# Patient Record
Sex: FEMALE | Race: WHITE
[De-identification: names, ages, dates, MRNs, and addresses within clinical notes are randomized per-mention and may not be internally consistent; named-entity substitution may affect disease eponyms.]

---

## 2017-10-26 ENCOUNTER — HOSPITAL ENCOUNTER (OUTPATIENT)
Dept: HOSPITAL 53 - M WHC | Age: 62
End: 2017-10-26
Attending: PHYSICIAN ASSISTANT
Payer: COMMERCIAL

## 2017-10-26 DIAGNOSIS — Z12.31: Primary | ICD-10-CM

## 2017-10-26 NOTE — REPMRS
Patient History

The patient states she had a clinical breast exam in 10/16

Patient is postmenopausal.

 

Digital Woman Screen Mammo: October 26, 2017 - Exam #: 

[id number]

Bilateral CC and MLO view(s) were taken.

 

Technologist: Sharifa Mcdonald, Technologist

Prior study comparison: October 28, 2016, digital woman screen 

mammo performed at [facility].  October 13, 2015, 

digital woman screen mammo performed at [facility].

 

FINDINGS: There are scattered fibroglandular densities.  There 

has been no change in the appearance of the mammogram from the 

prior studies.  There is a mild amount of residual fibroglandular

tissue which is fairly symmetric. There is no interval 

development of dominant mass, architectural distortion, or 

clustered microcalcification suggestive of malignancy.

 

ASSESSMENT: BI-RADS/ACR category 1 mammogram. Negative.

 

Recommendation

Routine screening mammogram in 1 year (for women over age 40).

This mammogram was interpreted with the aid of an FDA-approved 

computer-aided dectection system.

 

Electronically Signed By: Werner Ramirez MD 10/26/17 2544

## 2018-10-31 ENCOUNTER — HOSPITAL ENCOUNTER (OUTPATIENT)
Dept: HOSPITAL 53 - M WHC | Age: 63
End: 2018-10-31
Attending: PHYSICIAN ASSISTANT
Payer: COMMERCIAL

## 2018-10-31 DIAGNOSIS — Z12.31: Primary | ICD-10-CM

## 2018-10-31 PROCEDURE — 77067 SCR MAMMO BI INCL CAD: CPT

## 2019-11-12 ENCOUNTER — HOSPITAL ENCOUNTER (OUTPATIENT)
Dept: HOSPITAL 53 - M WHC | Age: 64
End: 2019-11-12
Attending: PHYSICIAN ASSISTANT
Payer: COMMERCIAL

## 2019-11-12 DIAGNOSIS — Z78.0: ICD-10-CM

## 2019-11-12 DIAGNOSIS — Z12.31: Primary | ICD-10-CM

## 2019-11-12 DIAGNOSIS — Z80.0: ICD-10-CM

## 2019-11-12 NOTE — REPMRS
Patient History

The patient states she had a clinical breast exam in 10/2019.

Patient is postmenopausal.

Family history of colorectal cancer at age 50 or over in father.

No Hormone Replacement Therapy

 

Digital Woman Screen Mammo: November 12, 2019 - Exam #: 

[id number]

Bilateral CC and MLO view(s) were taken.

 

Technologist: Karla Morel, Technologist

Prior study comparison: October 31, 2018, bilateral digital woman

screen mammo performed at [facility].  

October 26, 2017, digital woman screen mammo performed at 

[facility].  October 28, 2016, digital 

woman screen mammo performed at [facility].

 

FINDINGS: The breast tissue is heterogeneously dense.  This may 

lower the sensitivity of mammography.  There is a moderate amount

of heterogeneously dense fibroglandular tissue which is fairly 

symmetric. There is no interval development of dominant mass, 

architectural distortion, or grouped microcalcification typical 

of malignancy. There has been no change in the appearance of the 

mammogram from the prior studies.

3-D tomosynthesis shows no additional findings.

 

Assessment: BI-RADS/ACR category 1 mammogram. Negative Mammogram.

 

Recommendation

Routine screening mammogram of both breasts in 1 year (for women 

over age 40).

This patient's Lifetime Breast Cancer RIsk is estimated at 2.4 %.

This mammogram was interpreted with the aid of an FDA-approved 

computer-aided dectection system.

 

Electronically Signed By: Vince Tomas MD 11/12/19 6619

## 2021-04-15 ENCOUNTER — HOSPITAL ENCOUNTER (OUTPATIENT)
Dept: HOSPITAL 53 - M WHC | Age: 66
End: 2021-04-15
Attending: NURSE PRACTITIONER
Payer: MEDICARE

## 2021-04-15 DIAGNOSIS — Z80.0: ICD-10-CM

## 2021-04-15 DIAGNOSIS — Z12.31: Primary | ICD-10-CM

## 2021-04-15 NOTE — REPMRS
Patient History

The patient states she had a clinical breast exam in 06/2020

Family history of colorectal cancer at age 50 or over in father.

No Hormone Replacement Therapy

 

Digital Woman Screen Mammo: April 15, 2021 - Exam #: 

[id number]

Bilateral CC and MLO view(s) were taken.

 

Technologist: Sharifa Mcdonald, Technologist

Prior study comparison: November 12, 2019, bilateral digital 

woman screen mammo performed at [facility].  October 31, 2018, bilateral digital woman 

screen mammo performed at [facility].  October 26, 2017, digital woman screen mammo 

performed at [facility].

 

FINDINGS: The breast tissue is heterogeneously dense.  This may 

lower the sensitivity of mammography.  The Volpara volumetric 

breast density category is: C.  There is a moderate amount of 

heterogeneously dense fibroglandular tissue which is fairly 

symmetric. There is no interval development of dominant mass, 

architectural distortion, or grouped microcalcification typical 

of malignancy. There has been no change in the appearance of the 

mammogram from the prior studies.

3-D tomosynthesis shows no additional findings.

 

Assessment: BI-RADS/ACR category 1 mammogram. Negative Mammogram.

 

Recommendation

Routine screening mammogram of both breasts in 1 year (for women 

over age 40).

This patient's [facility] Lifetime Breast Cancer RIsk is 

estimated at 2.2 %.

This mammogram was interpreted with the aid of an FDA-approved 

computer-aided dectection system.

 

Electronically Signed By: Vince Tomas MD 04/15/21 [id number]

## 2022-05-20 ENCOUNTER — HOSPITAL ENCOUNTER (OUTPATIENT)
Dept: HOSPITAL 53 - M WHC | Age: 67
End: 2022-05-20
Attending: NURSE PRACTITIONER
Payer: MEDICARE

## 2022-05-20 DIAGNOSIS — Z12.31: Primary | ICD-10-CM

## 2023-08-15 ENCOUNTER — HOSPITAL ENCOUNTER (OUTPATIENT)
Dept: HOSPITAL 53 - M WHC | Age: 68
End: 2023-08-15
Attending: NURSE PRACTITIONER
Payer: MEDICARE

## 2023-08-15 DIAGNOSIS — Z12.31: Primary | ICD-10-CM

## 2024-10-09 ENCOUNTER — HOSPITAL ENCOUNTER (OUTPATIENT)
Dept: HOSPITAL 53 - M WHC | Age: 69
End: 2024-10-09
Attending: PHYSICIAN ASSISTANT
Payer: MEDICARE

## 2024-10-09 DIAGNOSIS — R92.333: ICD-10-CM

## 2024-10-09 DIAGNOSIS — Z12.31: Primary | ICD-10-CM
